# Patient Record
Sex: FEMALE | Race: OTHER | HISPANIC OR LATINO
[De-identification: names, ages, dates, MRNs, and addresses within clinical notes are randomized per-mention and may not be internally consistent; named-entity substitution may affect disease eponyms.]

---

## 2018-10-16 ENCOUNTER — APPOINTMENT (OUTPATIENT)
Dept: ENDOCRINOLOGY | Facility: CLINIC | Age: 73
End: 2018-10-16
Payer: MEDICARE

## 2018-10-16 ENCOUNTER — LABORATORY RESULT (OUTPATIENT)
Age: 73
End: 2018-10-16

## 2018-10-16 VITALS
HEART RATE: 74 BPM | HEIGHT: 64 IN | SYSTOLIC BLOOD PRESSURE: 150 MMHG | WEIGHT: 182 LBS | BODY MASS INDEX: 31.07 KG/M2 | DIASTOLIC BLOOD PRESSURE: 74 MMHG

## 2018-10-16 DIAGNOSIS — Z00.00 ENCOUNTER FOR GENERAL ADULT MEDICAL EXAMINATION W/OUT ABNORMAL FINDINGS: ICD-10-CM

## 2018-10-16 DIAGNOSIS — Z86.39 PERSONAL HISTORY OF OTHER ENDOCRINE, NUTRITIONAL AND METABOLIC DISEASE: ICD-10-CM

## 2018-10-16 DIAGNOSIS — L65.9 NONSCARRING HAIR LOSS, UNSPECIFIED: ICD-10-CM

## 2018-10-16 DIAGNOSIS — R63.5 ABNORMAL WEIGHT GAIN: ICD-10-CM

## 2018-10-16 DIAGNOSIS — Z78.9 OTHER SPECIFIED HEALTH STATUS: ICD-10-CM

## 2018-10-16 PROCEDURE — 99204 OFFICE O/P NEW MOD 45 MIN: CPT | Mod: 25

## 2018-10-16 PROCEDURE — 36415 COLL VENOUS BLD VENIPUNCTURE: CPT

## 2018-10-25 LAB
17OHP SERPL-MCNC: 20 NG/DL
ALBUMIN SERPL ELPH-MCNC: 4.5 G/DL
ALP BLD-CCNC: 110 U/L
ALT SERPL-CCNC: 16 U/L
ANION GAP SERPL CALC-SCNC: 12 MMOL/L
AST SERPL-CCNC: 21 U/L
BASOPHILS # BLD AUTO: 0.04 K/UL
BASOPHILS NFR BLD AUTO: 0.6 %
BILIRUB SERPL-MCNC: 0.4 MG/DL
BUN SERPL-MCNC: 19 MG/DL
CALCIUM SERPL-MCNC: 10.3 MG/DL
CHLORIDE SERPL-SCNC: 100 MMOL/L
CHOLEST SERPL-MCNC: 216 MG/DL
CHOLEST/HDLC SERPL: 3.2 RATIO
CO2 SERPL-SCNC: 29 MMOL/L
CREAT SERPL-MCNC: 0.92 MG/DL
CREAT SPEC-SCNC: 230 MG/DL
DHEA-SULFATE, SERUM: 33 UG/DL
EOSINOPHIL # BLD AUTO: 0.06 K/UL
EOSINOPHIL NFR BLD AUTO: 0.8 %
FSH SERPL-MCNC: 63.1 IU/L
GLUCOSE SERPL-MCNC: 87 MG/DL
HBA1C MFR BLD HPLC: 6 %
HCT VFR BLD CALC: 44.3 %
HDLC SERPL-MCNC: 67 MG/DL
HGB BLD-MCNC: 14.7 G/DL
IMM GRANULOCYTES NFR BLD AUTO: 0.1 %
LDLC SERPL CALC-MCNC: 130 MG/DL
LYMPHOCYTES # BLD AUTO: 3.3 K/UL
LYMPHOCYTES NFR BLD AUTO: 46.7 %
MAN DIFF?: NORMAL
MCHC RBC-ENTMCNC: 29.5 PG
MCHC RBC-ENTMCNC: 33.2 GM/DL
MCV RBC AUTO: 89 FL
MICROALBUMIN 24H UR DL<=1MG/L-MCNC: 2.5 MG/DL
MICROALBUMIN/CREAT 24H UR-RTO: 11 MG/G
MONOCYTES # BLD AUTO: 0.48 K/UL
MONOCYTES NFR BLD AUTO: 6.8 %
NEUTROPHILS # BLD AUTO: 3.18 K/UL
NEUTROPHILS NFR BLD AUTO: 45 %
PLATELET # BLD AUTO: 225 K/UL
POTASSIUM SERPL-SCNC: 4.6 MMOL/L
PROT SERPL-MCNC: 7.8 G/DL
RBC # BLD: 4.98 M/UL
RBC # FLD: 16 %
SODIUM SERPL-SCNC: 141 MMOL/L
T3 SERPL-MCNC: 106 NG/DL
T4 FREE SERPL-MCNC: 0.9 NG/DL
TESTOST BND SERPL-MCNC: 2 PG/ML
TESTOST SERPL-MCNC: 73.5 NG/DL
THYROGLOB AB SERPL-ACNC: <20 IU/ML
THYROPEROXIDASE AB SERPL IA-ACNC: <10 IU/ML
TRIGL SERPL-MCNC: 96 MG/DL
TSH SERPL-ACNC: 3.96 UIU/ML
WBC # FLD AUTO: 7.07 K/UL

## 2018-10-30 ENCOUNTER — RX RENEWAL (OUTPATIENT)
Age: 73
End: 2018-10-30

## 2019-02-12 ENCOUNTER — APPOINTMENT (OUTPATIENT)
Dept: ENDOCRINOLOGY | Facility: CLINIC | Age: 74
End: 2019-02-12

## 2019-03-27 ENCOUNTER — APPOINTMENT (OUTPATIENT)
Dept: ORTHOPEDIC SURGERY | Facility: CLINIC | Age: 74
End: 2019-03-27

## 2019-04-16 ENCOUNTER — APPOINTMENT (OUTPATIENT)
Age: 74
End: 2019-04-16
Payer: MEDICARE

## 2019-04-16 VITALS
BODY MASS INDEX: 30.73 KG/M2 | WEIGHT: 180 LBS | DIASTOLIC BLOOD PRESSURE: 60 MMHG | HEART RATE: 76 BPM | OXYGEN SATURATION: 96 % | SYSTOLIC BLOOD PRESSURE: 100 MMHG | HEIGHT: 64 IN

## 2019-04-16 DIAGNOSIS — M54.42 LUMBAGO WITH SCIATICA, LEFT SIDE: ICD-10-CM

## 2019-04-16 DIAGNOSIS — Z80.9 FAMILY HISTORY OF MALIGNANT NEOPLASM, UNSPECIFIED: ICD-10-CM

## 2019-04-16 DIAGNOSIS — M54.41 LUMBAGO WITH SCIATICA, LEFT SIDE: ICD-10-CM

## 2019-04-16 DIAGNOSIS — G89.29 LUMBAGO WITH SCIATICA, LEFT SIDE: ICD-10-CM

## 2019-04-16 PROCEDURE — 99204 OFFICE O/P NEW MOD 45 MIN: CPT

## 2019-04-19 PROBLEM — Z80.9 FAMILY HISTORY OF MALIGNANT NEOPLASM: Status: ACTIVE | Noted: 2019-04-16

## 2019-04-19 NOTE — REASON FOR VISIT
[Initial Consultation] : an initial consultation for [Back Pain] : back pain [Radiculopathy] : radiculopathy

## 2019-04-28 NOTE — PHYSICAL EXAM
[de-identified] : General: patient is well developed, well nourished, in no acute \par distress, alert and oriented x 3. \par \par Mood and affect: normal\par \par Respiratory: no respiratory distress noted\par \par Skin: no scars over spine, skin intact, no erythema, increased warmth\par \par Alignment:The spine is well compensated in the coronal and sagittal plane.  There is no asymmetry on the granger forward bend test\par \par Gait: The patient is able to toe walk and heel walk without difficulty. The patient is able to tandem gait without difficulty.\par \par Palpation: no tenderness to palpation spine or paraspinal region\par \par Range of motion: Lumbar spine ROM is restricted\par \par Neurologic Exam:\par Motor: Manual Muscle testing in the lower extremities is 5 out of 5 in all muscle groups. There is no evidence of muscular atrophy in the lower extremities. Sensory: Sensation to light touch is grossly intact in the lower extremities\par \par Reflexes: DTR are present and symmetric throughout, negative staples bilaterally, negative inverted radial reflex bilaterally, no clonus, plantar responses are flexor\par \par Hip Exam: No pain with internal or external rotation of hips bilaterally\par \par Special tests: Straight leg raise test negative.  Cross straight leg test negative.  EV test negative\par \par Vascular: Examination of the peripheral vascular system demonstrates no evidence of congestion or edema. no evidence of lymphedema bilateral lower extremities, pulses are present and symmetric in both lower extremities.\par \par  [de-identified] : None

## 2019-04-28 NOTE — DISCUSSION/SUMMARY
[de-identified] : Discussed the results of the patient's history, physical exam, and imaging. Ms. Mcclure has been suffering from low back and buttocks pain for the past 6 months. She has failed oral pharmacologic management. I am recommending XR lumbar 4 view and MRI lumbar without contrast for further evaluation. The patient will follow up with me after imaging is completed, sooner if there is an issue.  All questions were answered.\par \par

## 2019-06-06 ENCOUNTER — APPOINTMENT (OUTPATIENT)
Dept: MRI IMAGING | Facility: HOSPITAL | Age: 74
End: 2019-06-06

## 2020-01-31 ENCOUNTER — APPOINTMENT (OUTPATIENT)
Dept: NEPHROLOGY | Facility: CLINIC | Age: 75
End: 2020-01-31
Payer: MEDICARE

## 2020-01-31 VITALS
BODY MASS INDEX: 30.9 KG/M2 | DIASTOLIC BLOOD PRESSURE: 56 MMHG | HEART RATE: 56 BPM | SYSTOLIC BLOOD PRESSURE: 98 MMHG | WEIGHT: 180 LBS

## 2020-01-31 DIAGNOSIS — N18.3 CHRONIC KIDNEY DISEASE, STAGE 3 (MODERATE): ICD-10-CM

## 2020-01-31 DIAGNOSIS — N17.9 ACUTE KIDNEY FAILURE, UNSPECIFIED: ICD-10-CM

## 2020-01-31 PROCEDURE — 99204 OFFICE O/P NEW MOD 45 MIN: CPT

## 2020-01-31 RX ORDER — BISOPROLOL FUMARATE AND HYDROCHLOROTHIAZIDE 5; 6.25 MG/1; MG/1
5-6.25 TABLET, COATED ORAL DAILY
Refills: 0 | Status: ACTIVE | COMMUNITY

## 2020-01-31 RX ORDER — SPIRONOLACTONE 25 MG/1
25 TABLET ORAL DAILY
Qty: 15 | Refills: 5 | Status: DISCONTINUED | COMMUNITY
Start: 2020-01-31 | End: 2020-01-31

## 2020-02-02 NOTE — HISTORY OF PRESENT ILLNESS
[FreeTextEntry1] : asked to see by Dr Ward for renal insufficiency\par pt is a 75 yo H woman hx obesity, hx HTN 40+ years, has been under control generally though couple of months ago had low BP with sx dizziness responded to lower dose aldactone (1/2 pill) -- had been put on aldactone in 2018 by endo for hair loss (but didn’t work) \par doesn’t check home BP \par Creat was 1.0 one year ago and 1.2 abt 9 mos ago-- nick to 1.55 on labs from 12/2019 \par lytes ok -- k 4.9, ca 9.6, alb 4.2, hgb 12.7,  UA tr prot, no rbc, many wbs, sqaumous \par renal sono 10.1 and 9.4 cm kidneys, unremarkable\par \par \par

## 2020-02-02 NOTE — ASSESSMENT
[FreeTextEntry1] : progressive renal insufficiency -- may have some CKD I suspect related to HTN nephrosclerosis but latest increase may be related to BP medications with relatively  low BP in pt with long hx HTN \par will hold aldactone (especially as reports hasn’t helped hair loss) \par check labs incl chem , urine studies, , CKD labs\par follow BP \par if fxn stable f/u in 3-4 mos  \par \par \par

## 2020-02-02 NOTE — CONSULT LETTER
[Consult Letter:] : I had the pleasure of evaluating your patient, [unfilled]. [Dear  ___] : Dear  [unfilled], [Please see my note below.] : Please see my note below. [Consult Closing:] : Thank you very much for allowing me to participate in the care of this patient.  If you have any questions, please do not hesitate to contact me. [Sincerely,] : Sincerely, [FreeTextEntry3] : Kurtis Espinoza MD, MOHAN\par Division of Nephrology\par [facility]\par  of Medicine\par [employer] [facility] [employer] \par \par \par \par \par

## 2020-06-01 ENCOUNTER — APPOINTMENT (OUTPATIENT)
Dept: NEPHROLOGY | Facility: CLINIC | Age: 75
End: 2020-06-01

## 2020-06-02 ENCOUNTER — APPOINTMENT (OUTPATIENT)
Dept: NEPHROLOGY | Facility: CLINIC | Age: 75
End: 2020-06-02

## 2021-04-27 ENCOUNTER — OUTPATIENT (OUTPATIENT)
Dept: OUTPATIENT SERVICES | Facility: HOSPITAL | Age: 76
LOS: 1 days | End: 2021-04-27
Payer: MEDICARE

## 2021-04-27 DIAGNOSIS — I25.10 ATHEROSCLEROTIC HEART DISEASE OF NATIVE CORONARY ARTERY WITHOUT ANGINA PECTORIS: ICD-10-CM

## 2021-04-27 PROCEDURE — 93306 TTE W/DOPPLER COMPLETE: CPT | Mod: 26

## 2021-04-27 PROCEDURE — 93306 TTE W/DOPPLER COMPLETE: CPT

## 2021-06-07 ENCOUNTER — APPOINTMENT (OUTPATIENT)
Dept: ENDOCRINOLOGY | Facility: CLINIC | Age: 76
End: 2021-06-07

## 2021-06-14 ENCOUNTER — APPOINTMENT (OUTPATIENT)
Dept: ENDOCRINOLOGY | Facility: CLINIC | Age: 76
End: 2021-06-14
Payer: MEDICARE

## 2021-06-14 VITALS
SYSTOLIC BLOOD PRESSURE: 115 MMHG | HEIGHT: 65 IN | HEART RATE: 80 BPM | WEIGHT: 186 LBS | BODY MASS INDEX: 30.99 KG/M2 | DIASTOLIC BLOOD PRESSURE: 80 MMHG

## 2021-06-14 PROCEDURE — 99205 OFFICE O/P NEW HI 60 MIN: CPT

## 2021-06-14 PROCEDURE — 99215 OFFICE O/P EST HI 40 MIN: CPT

## 2021-06-14 NOTE — PHYSICAL EXAM

## 2021-06-14 NOTE — ASSESSMENT
[Long Term Vascular Complications] : long term vascular complications of diabetes [Carbohydrate Consistent Diet] : carbohydrate consistent diet [Importance of Diet and Exercise] : importance of diet and exercise to improve glycemic control, achieve weight loss and improve cardiovascular health [FreeTextEntry1] : 1) Obesity, Morbid: Class I, complicated by preDM2. High risk of metabolic syndrome and future complications. Discussed options including meds, bariatric surgery and lifestyle modification. RB and alternatives discussed. Questions answered and she verbalized understanding. Refer to nutrition and start hypocaloric, hypocarb diet in addition to exercise regimen. r/o hypothyroidism. Some weight loss (3 lbs). If no 5-7% weight loss observed on f/u, will consider metformin initiation.\par \par Abnormal TSH\par TSH target for age group ~5-7. fT4 and TT3 wnl. check anti thyroid antibodies. appears clinically euthyroid. hold off on replacement at this time. Verbalized understanding and agrees with treatment plan, will contact MD and seek emergency medical care if condition changes.\par \par HLD\par adequate response to atorvastatin moderate dose.

## 2021-06-14 NOTE — HISTORY OF PRESENT ILLNESS
[FreeTextEntry1] : 77 y/o F w/ Hx of preDM2, obesity, HLD\par here for initial evaluation and management of metabolic issues\par generally feels well and endorses no acute complaints.\par reports cc of worsening LDL control, recently started on atorvastatin 40 mg w/ adequate response. 4/2021 labs show LDL at 50, TSH was incidentally found elevated at 7.99, fT4 at 0.8. A1C at 5.9% (stable). all other labs wnl. She otherwise denies any f/c, CP, SOB, palpitations, tremors, depressed mood, anxiety, palpitations, n/v, stool/urinary abn, skin/weight changes, heat/cold intolerance, HAs, breast/nipple changes, polyuria/polydipsia/nocturia or other complaints.\par she again denies any dysphagia, hoarseness, neck tenderness or new palpable masses. she again denies any family history of thyroid disorders or personal exposure to ionizing radiation.\par \par  Gastroenterology

## 2021-08-30 ENCOUNTER — APPOINTMENT (OUTPATIENT)
Dept: ENDOCRINOLOGY | Facility: CLINIC | Age: 76
End: 2021-08-30
Payer: MEDICARE

## 2021-08-30 VITALS
BODY MASS INDEX: 27.49 KG/M2 | HEIGHT: 65 IN | DIASTOLIC BLOOD PRESSURE: 80 MMHG | SYSTOLIC BLOOD PRESSURE: 120 MMHG | HEART RATE: 100 BPM | WEIGHT: 165 LBS

## 2021-08-30 PROCEDURE — 99215 OFFICE O/P EST HI 40 MIN: CPT

## 2021-08-30 NOTE — ASSESSMENT
[Long Term Vascular Complications] : long term vascular complications of diabetes [Carbohydrate Consistent Diet] : carbohydrate consistent diet [Importance of Diet and Exercise] : importance of diet and exercise to improve glycemic control, achieve weight loss and improve cardiovascular health [FreeTextEntry1] : 1) Obesity, Morbid: Class I, complicated by preDM2. High risk of metabolic syndrome and future complications. Discussed options including meds, bariatric surgery and lifestyle modification. RB and alternatives discussed. Questions answered and she verbalized understanding. Refer to nutrition and start hypocaloric, hypocarb diet in addition to exercise regimen. r/o hypothyroidism. Some weight loss (3 lbs). If no 5-7% weight loss observed on f/u, will proceed w/ metformin initiation.\par \par Abnormal TSH\par TSH at target for age group ~5-7, 4 on 8/2021 fT4 and TT3 wnl. negative anti thyroid antibodies. appears clinically euthyroid. hold off on replacement at this time. Verbalized understanding and agrees with treatment plan, will contact MD and seek emergency medical care if condition changes.\par \par HLD\par adequate response to atorvastatin moderate dose.

## 2021-08-30 NOTE — PHYSICAL EXAM
[Alert] : alert [Well Nourished] : well nourished [No Acute Distress] : no acute distress [Well Developed] : well developed [Normal Sclera/Conjunctiva] : normal sclera/conjunctiva [EOMI] : extra ocular movement intact [No Proptosis] : no proptosis [Normal Oropharynx] : the oropharynx was normal [Thyroid Not Enlarged] : the thyroid was not enlarged [No Thyroid Nodules] : no palpable thyroid nodules [No Respiratory Distress] : no respiratory distress [No Accessory Muscle Use] : no accessory muscle use [Clear to Auscultation] : lungs were clear to auscultation bilaterally [Normal S1, S2] : normal S1 and S2 [Normal Rate] : heart rate was normal [Regular Rhythm] : with a regular rhythm [No Edema] : no peripheral edema [Pedal Pulses Normal] : the pedal pulses are present [Normal Bowel Sounds] : normal bowel sounds [Not Tender] : non-tender [Not Distended] : not distended [Soft] : abdomen soft [Normal Anterior Cervical Nodes] : no anterior cervical lymphadenopathy [No Spinal Tenderness] : no spinal tenderness [Spine Straight] : spine straight [No Stigmata of Cushings Syndrome] : no stigmata of Cushings Syndrome [Normal Gait] : normal gait [Normal Strength/Tone] : muscle strength and tone were normal [No Rash] : no rash [No Tremors] : no tremors [Normal Reflexes] : deep tendon reflexes were 2+ and symmetric [Oriented x3] : oriented to person, place, and time [Acanthosis Nigricans] : no acanthosis nigricans

## 2021-08-30 NOTE — HISTORY OF PRESENT ILLNESS
[FreeTextEntry1] : 75 y/o F w/ Hx of preDM2, obesity, HLD\par initial evaluation and management of metabolic issues\par generally feels well and endorses no acute complaints.\par reports cc of worsening LDL control, recently started on atorvastatin 40 mg w/ adequate response. 4/2021 labs show LDL at 50, TSH was incidentally found elevated at 7.99, fT4 at 0.8. A1C at 5.9% (stable). all other labs wnl. \par \par 8/2021 Here for /fu, generally feels well and endorses no acute complaints. No interval events since LV. Today comes for lab review, A12 at 5.8%. TFTs and thyrodi antibodies wnl.\par She otherwise denies any f/c, CP, SOB, palpitations, tremors, depressed mood, anxiety, palpitations, n/v, stool/urinary abn, skin/weight changes, heat/cold intolerance, HAs, breast/nipple changes, polyuria/polydipsia/nocturia or other complaints.\par she again denies any dysphagia, hoarseness, neck tenderness or new palpable masses. she again denies any family history of thyroid disorders or personal exposure to ionizing radiation.\par \par

## 2021-12-13 ENCOUNTER — APPOINTMENT (OUTPATIENT)
Dept: ENDOCRINOLOGY | Facility: CLINIC | Age: 76
End: 2021-12-13
Payer: MEDICARE

## 2021-12-13 VITALS
HEART RATE: 80 BPM | SYSTOLIC BLOOD PRESSURE: 140 MMHG | WEIGHT: 179 LBS | DIASTOLIC BLOOD PRESSURE: 80 MMHG | HEIGHT: 65 IN | BODY MASS INDEX: 29.82 KG/M2

## 2021-12-13 PROCEDURE — 99215 OFFICE O/P EST HI 40 MIN: CPT

## 2021-12-13 NOTE — ASSESSMENT
[FreeTextEntry1] : 1) Obesity, Morbid: Class I, complicated by preDM2. High risk of metabolic syndrome and future complications. Discussed options including meds, bariatric surgery and lifestyle modification. RB and alternatives discussed. Questions answered and she verbalized understanding. Refer to nutrition and start hypocaloric, hypocarb diet in addition to exercise regimen. r/o hypothyroidism. Some weight loss (3 lbs). If no 5-7% weight loss observed on f/u, will proceed w/ metformin initiation (concerned about hx of HOUSE, advised on evidence for benefit w/ metformin use)\par \par Abnormal TSH\par TSH at target for age group ~5-7, 4 on 8/2021 fT4 and TT3 wnl. negative anti thyroid antibodies. appears clinically euthyroid. hold off on replacement at this time. Verbalized understanding and agrees with treatment plan, will contact MD and seek emergency medical care if condition changes.\par \par HLD\par adequate response to atorvastatin moderate dose. [Long Term Vascular Complications] : long term vascular complications of diabetes [Carbohydrate Consistent Diet] : carbohydrate consistent diet [Importance of Diet and Exercise] : importance of diet and exercise to improve glycemic control, achieve weight loss and improve cardiovascular health

## 2021-12-13 NOTE — HISTORY OF PRESENT ILLNESS
[FreeTextEntry1] : 77 y/o F w/ Hx of preDM2, obesity, HLD\par initial evaluation and management of metabolic issues\par generally feels well and endorses no acute complaints.\par reports cc of worsening LDL control, recently started on atorvastatin 40 mg w/ adequate response. 4/2021 labs show LDL at 50, TSH was incidentally found elevated at 7.99, fT4 at 0.8. A1C at 5.9% (stable). all other labs wnl. \par \par 12/2021 Here for /fu, generally feels well and endorses no acute complaints. No interval events since LV. Today comes for lab review, A1C at 5.8%. TFTs and thyrodi antibodies wnl.\par She otherwise denies any f/c, CP, SOB, palpitations, tremors, depressed mood, anxiety, palpitations, n/v, stool/urinary abn, skin/weight changes, heat/cold intolerance, HAs, breast/nipple changes, polyuria/polydipsia/nocturia or other complaints.\par she again denies any dysphagia, hoarseness, neck tenderness or new palpable masses. she again denies any family history of thyroid disorders or personal exposure to ionizing radiation.\par \par

## 2022-02-08 ENCOUNTER — APPOINTMENT (OUTPATIENT)
Dept: ENDOCRINOLOGY | Facility: CLINIC | Age: 77
End: 2022-02-08
Payer: MEDICARE

## 2022-02-08 PROCEDURE — 99443: CPT

## 2022-02-10 NOTE — HISTORY OF PRESENT ILLNESS
[Home] : at home, [unfilled] , at the time of the visit. [Medical Office: ([facility])___] : at the medical office located in  [Verbal consent obtained from patient] : the patient, [unfilled] [FreeTextEntry1] : 75 y/o F w/ Hx of preDM2, obesity, HLD\par initial evaluation and management of metabolic issues\par generally feels well and endorses no acute complaints.\par reports cc of worsening LDL control, recently started on atorvastatin 40 mg w/ adequate response. 4/2021 labs show LDL at 50, TSH was incidentally found elevated at 7.99, fT4 at 0.8. A1C at 5.9% (stable). all other labs wnl. \par \par 2/2022 Call for /fu, generally feels well and endorses no acute complaints. No interval events since LV. Today comes for lab review, A1C at 5.8%. TFTs and thyrodi antibodies wnl.\par She otherwise denies any f/c, CP, SOB, palpitations, tremors, depressed mood, anxiety, palpitations, n/v, stool/urinary abn, skin/weight changes, heat/cold intolerance, HAs, breast/nipple changes, polyuria/polydipsia/nocturia or other complaints.\par she again denies any dysphagia, hoarseness, neck tenderness or new palpable masses. she again denies any family history of thyroid disorders or personal exposure to ionizing radiation.\par \par

## 2022-03-14 ENCOUNTER — APPOINTMENT (OUTPATIENT)
Dept: ENDOCRINOLOGY | Facility: CLINIC | Age: 77
End: 2022-03-14
Payer: MEDICARE

## 2022-03-14 VITALS
SYSTOLIC BLOOD PRESSURE: 130 MMHG | HEIGHT: 65 IN | HEART RATE: 88 BPM | WEIGHT: 177 LBS | BODY MASS INDEX: 29.49 KG/M2 | DIASTOLIC BLOOD PRESSURE: 70 MMHG

## 2022-03-14 PROCEDURE — 99215 OFFICE O/P EST HI 40 MIN: CPT

## 2022-03-14 NOTE — HISTORY OF PRESENT ILLNESS
[FreeTextEntry1] : 77 y/o F w/ Hx of preDM2, obesity, HLD\par initial evaluation and management of metabolic issues\par generally feels well and endorses no acute complaints.\par reports cc of worsening LDL control, recently started on atorvastatin 40 mg w/ adequate response. 4/2021 labs show LDL at 50, TSH was incidentally found elevated at 7.99, fT4 at 0.8. A1C at 5.9% (stable). all other labs wnl. \par \par 3/2022 Here for /fu, generally feels well and endorses no acute complaints. No interval events since LV. Today comes for lab review, A1C at 5.7%. TFTs and thyroid antibodies wnl.\par She otherwise denies any f/c, CP, SOB, palpitations, tremors, depressed mood, anxiety, palpitations, n/v, stool/urinary abn, skin/weight changes, heat/cold intolerance, HAs, breast/nipple changes, polyuria/polydipsia/nocturia or other complaints.\par she again denies any dysphagia, hoarseness, neck tenderness or new palpable masses. she again denies any family history of thyroid disorders or personal exposure to ionizing radiation.\par \par

## 2022-03-14 NOTE — ASSESSMENT
[Long Term Vascular Complications] : long term vascular complications of diabetes [Carbohydrate Consistent Diet] : carbohydrate consistent diet [Importance of Diet and Exercise] : importance of diet and exercise to improve glycemic control, achieve weight loss and improve cardiovascular health [FreeTextEntry1] : 1) Obesity, Morbid: Class I, complicated by preDM2. High risk of metabolic syndrome and future complications. Discussed options including meds, bariatric surgery and lifestyle modification. RB and alternatives discussed. Questions answered and she verbalized understanding. Refer to nutrition and start hypocaloric, hypocarb diet in addition to exercise regimen. r/o hypothyroidism. Some weight loss (3 lbs). If no 5-7% weight loss observed on f/u, will proceed w/ metformin initiation (concerned about hx of HOUSE, advised on evidence for benefit w/ metformin use)\par \par Abnormal TSH\par TSH at target for age group ~5-7, 5.4 on 3/2022 fT4 and TT3 wnl. negative anti thyroid antibodies. appears clinically euthyroid. hold off on replacement at this time. Verbalized understanding and agrees with treatment plan, will contact MD and seek emergency medical care if condition changes.\par \par HLD\par adequate response to atorvastatin moderate dose.

## 2022-07-18 ENCOUNTER — APPOINTMENT (OUTPATIENT)
Dept: ENDOCRINOLOGY | Facility: CLINIC | Age: 77
End: 2022-07-18

## 2022-07-18 VITALS
DIASTOLIC BLOOD PRESSURE: 80 MMHG | HEART RATE: 85 BPM | BODY MASS INDEX: 28.66 KG/M2 | HEIGHT: 65 IN | SYSTOLIC BLOOD PRESSURE: 120 MMHG | WEIGHT: 172 LBS

## 2022-07-18 DIAGNOSIS — E66.9 OBESITY, UNSPECIFIED: ICD-10-CM

## 2022-07-18 DIAGNOSIS — R73.03 PREDIABETES.: ICD-10-CM

## 2022-07-18 DIAGNOSIS — E78.5 HYPERLIPIDEMIA, UNSPECIFIED: ICD-10-CM

## 2022-07-18 DIAGNOSIS — I10 ESSENTIAL (PRIMARY) HYPERTENSION: ICD-10-CM

## 2022-07-18 DIAGNOSIS — R79.89 OTHER SPECIFIED ABNORMAL FINDINGS OF BLOOD CHEMISTRY: ICD-10-CM

## 2022-07-18 PROCEDURE — 99215 OFFICE O/P EST HI 40 MIN: CPT

## 2022-07-18 RX ORDER — AMLODIPINE AND OLMESARTAN MEDOXOMIL 5; 40 MG/1; MG/1
5-40 TABLET ORAL
Qty: 60 | Refills: 0 | Status: ACTIVE | COMMUNITY
Start: 2022-01-14

## 2022-07-18 RX ORDER — AMLODIPINE BESYLATE AND BENAZEPRIL HYDROCHLORIDE 5; 40 MG/1; MG/1
5-40 CAPSULE ORAL
Qty: 1 | Refills: 0 | Status: COMPLETED | COMMUNITY
End: 2022-07-18

## 2022-07-18 RX ORDER — METFORMIN ER 500 MG 500 MG/1
500 TABLET ORAL DAILY
Qty: 180 | Refills: 2 | Status: ACTIVE | COMMUNITY
Start: 2021-08-30 | End: 1900-01-01

## 2022-07-18 RX ORDER — ATORVASTATIN CALCIUM 40 MG/1
40 TABLET, FILM COATED ORAL
Qty: 90 | Refills: 0 | Status: ACTIVE | COMMUNITY
Start: 2022-06-30

## 2022-07-18 NOTE — ASSESSMENT
[FreeTextEntry1] : 1) Obesity, Morbid: Class I, complicated by preDM2. High risk of metabolic syndrome and future complications. Discussed options including meds, bariatric surgery and lifestyle modification. RB and alternatives discussed. Questions answered and she verbalized understanding. Refer to nutrition and start hypocaloric, hypocarb diet in addition to exercise regimen. r/o hypothyroidism. Some weight loss (3 lbs). If no 5-7% weight loss observed on f/u, will proceed w/ metformin initiation (concerned about hx of HOUSE, advised on evidence for benefit w/ metformin use)\par \par Abnormal TSH\par TSH at target for age group ~5-7, 5.4 on 3/2022 fT4 and TT3 wnl. negative anti thyroid antibodies. appears clinically euthyroid. hold off on replacement at this time. Verbalized understanding and agrees with treatment plan, will contact MD and seek emergency medical care if condition changes.\par \par HLD\par adequate response to atorvastatin moderate dose. [Long Term Vascular Complications] : long term vascular complications of diabetes [Carbohydrate Consistent Diet] : carbohydrate consistent diet [Importance of Diet and Exercise] : importance of diet and exercise to improve glycemic control, achieve weight loss and improve cardiovascular health

## 2022-07-18 NOTE — HISTORY OF PRESENT ILLNESS
[FreeTextEntry1] : 76 y/o F w/ Hx of preDM2, obesity, HLD\par initial evaluation and management of metabolic issues\par generally feels well and endorses no acute complaints.\par reports cc of worsening LDL control, recently started on atorvastatin 40 mg w/ adequate response. 4/2021 labs show LDL at 50, TSH was incidentally found elevated at 7.99, fT4 at 0.8. A1C at 5.9% (stable). all other labs wnl. \par \par 7/2022 Here for /fu, generally feels well and endorses no acute complaints. No interval events since LV. Today comes for lab review, A1C at 5.7%. TFTs and thyroid antibodies wnl.\par She otherwise denies any f/c, CP, SOB, palpitations, tremors, depressed mood, anxiety, palpitations, n/v, stool/urinary abn, skin/weight changes, heat/cold intolerance, HAs, breast/nipple changes, polyuria/polydipsia/nocturia or other complaints.\par she again denies any dysphagia, hoarseness, neck tenderness or new palpable masses. she again denies any family history of thyroid disorders or personal exposure to ionizing radiation.\par \par

## 2023-11-10 NOTE — REVIEW OF SYSTEMS
Addended by: TREVER PONCE on: 11/10/2023 04:50 PM     Modules accepted: Orders     [Negative] : Heme/Lymph